# Patient Record
Sex: MALE | Race: WHITE
[De-identification: names, ages, dates, MRNs, and addresses within clinical notes are randomized per-mention and may not be internally consistent; named-entity substitution may affect disease eponyms.]

---

## 2018-08-09 ENCOUNTER — HOSPITAL ENCOUNTER (OUTPATIENT)
Dept: HOSPITAL 47 - RADXRYALE | Age: 18
End: 2018-08-09
Payer: COMMERCIAL

## 2018-08-09 DIAGNOSIS — S22.39XA: Primary | ICD-10-CM

## 2018-08-09 PROCEDURE — 71111 X-RAY EXAM RIBS/CHEST4/> VWS: CPT

## 2018-08-09 NOTE — XR
EXAMINATION TYPE: XR ribs bilat w pa chest xray

 

DATE OF EXAM: 8/9/2018

 

COMPARISON: NONE

 

HISTORY: Intermittent sternal and rib pain

 

TECHNIQUE: Frontal view of the chest as well as frontal and oblique views of the ribs were obtained.

 

FINDINGS: The lungs are clear without evidence of focal consolidation, pleural effusion or pneumothor
ax. Cardiomediastinal silhouette is within normal limits. Osseous structures are intact. No callused 
healed rib fracture is seen nor acute displaced rib fracture. No suspicious osseous lesion is seen.

 

IMPRESSION: No acute cardiopulmonary pathology, acute displaced rib fracture, callused healed rib fra
cture or suspicious osseous lesion seen.

## 2021-03-19 ENCOUNTER — HOSPITAL ENCOUNTER (OUTPATIENT)
Dept: HOSPITAL 47 - RADMRIMAIN | Age: 21
End: 2021-03-19
Attending: INTERNAL MEDICINE
Payer: COMMERCIAL

## 2021-03-19 DIAGNOSIS — E29.1: Primary | ICD-10-CM

## 2021-03-19 DIAGNOSIS — D35.2: ICD-10-CM

## 2021-03-19 PROCEDURE — 70553 MRI BRAIN STEM W/O & W/DYE: CPT

## 2021-03-19 NOTE — MR
EXAMINATION TYPE: MR pituitary wo/w con

 

DATE OF EXAM: 3/19/2021

 

COMPARISON: NONE

 

HISTORY: Testicular hypofunction, Prolactin Increased

 

TECHNIQUE: 

Multiplanar, multisequence images of the brain and brainstem is performed without and with IV contras
t, utilizing 12 mL intravenous Gadavist . Pituitary gland protocol.

 

FINDINGS: Pituitary gland normal in size within the pituitary stalk. Pituitary stalk shows normal enh
ancement in the midline. Concave superior margin noted. 

 

Postcontrast images show some heterogeneous enhancement with suggestion on T2-weighted images of a 5 
x 4 mm lesion left aspect of the gland coronal image 10 series 401. On T1 precontrast images there ar
e some heterogeneous diminished signal at this level without definitive lesion. Postcontrast images s
how some heterogeneous diminished enhancement at this level versus the opposite right side. Findings 
are concerning for focal pituitary microadenoma. I would expect complete nonenhancement to be more de
finitive.

 

Suprasellar cistern is maintained. Optic chiasm is not effaced. Visualized brain parenchyma unremarka
ble. No hydrocephalus. Craniocervical junction preserved. 

 

IMPRESSION: As above. MRI findings suspicious for a 5 x 4 mm left-sided pituitary microadenoma.

## 2022-04-23 ENCOUNTER — HOSPITAL ENCOUNTER (EMERGENCY)
Dept: HOSPITAL 47 - EC | Age: 22
Discharge: HOME | End: 2022-04-23
Payer: COMMERCIAL

## 2022-04-23 VITALS — RESPIRATION RATE: 87 BRPM | SYSTOLIC BLOOD PRESSURE: 125 MMHG | DIASTOLIC BLOOD PRESSURE: 85 MMHG

## 2022-04-23 VITALS — TEMPERATURE: 97.8 F | HEART RATE: 87 BPM

## 2022-04-23 DIAGNOSIS — F17.200: ICD-10-CM

## 2022-04-23 DIAGNOSIS — R19.7: ICD-10-CM

## 2022-04-23 DIAGNOSIS — R74.01: ICD-10-CM

## 2022-04-23 DIAGNOSIS — R11.2: Primary | ICD-10-CM

## 2022-04-23 LAB
ALBUMIN SERPL-MCNC: 4 G/DL (ref 3.5–5)
ALP SERPL-CCNC: 56 U/L (ref 38–126)
ALT SERPL-CCNC: 80 U/L (ref 4–49)
AMYLASE SERPL-CCNC: 59 U/L (ref 30–110)
ANION GAP SERPL CALC-SCNC: 7 MMOL/L
AST SERPL-CCNC: 29 U/L (ref 17–59)
BASOPHILS # BLD AUTO: 0.1 K/UL (ref 0–0.2)
BASOPHILS NFR BLD AUTO: 1 %
BUN SERPL-SCNC: 12 MG/DL (ref 9–20)
CALCIUM SPEC-MCNC: 8.9 MG/DL (ref 8.4–10.2)
CHLORIDE SERPL-SCNC: 106 MMOL/L (ref 98–107)
CO2 SERPL-SCNC: 27 MMOL/L (ref 22–30)
EOSINOPHIL # BLD AUTO: 0.2 K/UL (ref 0–0.7)
EOSINOPHIL NFR BLD AUTO: 3 %
ERYTHROCYTE [DISTWIDTH] IN BLOOD BY AUTOMATED COUNT: 5.39 M/UL (ref 4.3–5.9)
ERYTHROCYTE [DISTWIDTH] IN BLOOD: 12.7 % (ref 11.5–15.5)
GLUCOSE SERPL-MCNC: 104 MG/DL (ref 74–99)
HCT VFR BLD AUTO: 46 % (ref 39–53)
HGB BLD-MCNC: 15.5 GM/DL (ref 13–17.5)
LIPASE SERPL-CCNC: 155 U/L (ref 23–300)
LYMPHOCYTES # SPEC AUTO: 1.3 K/UL (ref 1–4.8)
LYMPHOCYTES NFR SPEC AUTO: 16 %
MCH RBC QN AUTO: 28.8 PG (ref 25–35)
MCHC RBC AUTO-ENTMCNC: 33.7 G/DL (ref 31–37)
MCV RBC AUTO: 85.3 FL (ref 80–100)
MONOCYTES # BLD AUTO: 0.6 K/UL (ref 0–1)
MONOCYTES NFR BLD AUTO: 8 %
NEUTROPHILS # BLD AUTO: 5.8 K/UL (ref 1.3–7.7)
NEUTROPHILS NFR BLD AUTO: 71 %
PH UR: 5.5 [PH] (ref 5–8)
PLATELET # BLD AUTO: 302 K/UL (ref 150–450)
POTASSIUM SERPL-SCNC: 3.7 MMOL/L (ref 3.5–5.1)
PROT SERPL-MCNC: 7 G/DL (ref 6.3–8.2)
SODIUM SERPL-SCNC: 140 MMOL/L (ref 137–145)
SP GR UR: 1.03 (ref 1–1.03)
UROBILINOGEN UR QL STRIP: <2 MG/DL (ref ?–2)
WBC # BLD AUTO: 8.1 K/UL (ref 3.8–10.6)

## 2022-04-23 PROCEDURE — 82150 ASSAY OF AMYLASE: CPT

## 2022-04-23 PROCEDURE — 81003 URINALYSIS AUTO W/O SCOPE: CPT

## 2022-04-23 PROCEDURE — 80053 COMPREHEN METABOLIC PANEL: CPT

## 2022-04-23 PROCEDURE — 99284 EMERGENCY DEPT VISIT MOD MDM: CPT

## 2022-04-23 PROCEDURE — 85025 COMPLETE CBC W/AUTO DIFF WBC: CPT

## 2022-04-23 PROCEDURE — 83690 ASSAY OF LIPASE: CPT

## 2022-04-23 PROCEDURE — 96361 HYDRATE IV INFUSION ADD-ON: CPT

## 2022-04-23 PROCEDURE — 96374 THER/PROPH/DIAG INJ IV PUSH: CPT

## 2022-04-23 PROCEDURE — 36415 COLL VENOUS BLD VENIPUNCTURE: CPT

## 2022-04-23 PROCEDURE — 74018 RADEX ABDOMEN 1 VIEW: CPT

## 2022-04-23 NOTE — XR
EXAMINATION TYPE: XR KUB

 

DATE OF EXAM: 4/23/2022

 

COMPARISON: NONE

 

HISTORY: Vomiting and fever

 

TECHNIQUE: 2 views upright

 

FINDINGS: There is no sign of intestinal obstruction or pneumoperitoneum. Fecal pattern is normal. No
 evidence of a mass. Lung bases are clear. There are no pathologic calcifications.

 

IMPRESSION: Nonacute abdomen.

## 2022-04-23 NOTE — ED
Nausea/Vomiting/Diarrhea HPI





- General


Chief complaint: Nausea/Vomiting/Diarrhea


Stated complaint: vomiting, fever


Time Seen by Provider: 04/23/22 13:50


Source: patient


Mode of arrival: ambulatory


Limitations: no limitations





- History of Present Illness


Initial comments: 


Patient is a 21-year-old male presenting with chief complaint of nausea, 

vomiting, diarrhea.  Patient states that this has been going on for several 

weeks.  It is intermittent, patient does not notice any patterns related to his 

symptoms.  No sick contacts or recent travel.  Denies abdominal pain, chest 

pain, shortness of breath, fever, chills, hematochezia, hematemesis, weakness, 

headache, vision or hearing changes, numbness, tingling, URI-like symptoms.








- Related Data


                                Home Medications











 Medication  Instructions  Recorded  Confirmed


 


ARIPiprazole [Abilify] 5 mg PO HS 04/23/22 04/23/22


 


Escitalopram [Lexapro] 20 mg PO HS 04/23/22 04/23/22


 


tadalafiL 5 mg PO DAILY PRN 04/23/22 04/23/22











                                    Allergies











Allergy/AdvReac Type Severity Reaction Status Date / Time


 


No Known Allergies Allergy   Verified 04/23/22 16:56














Review of Systems


ROS Statement: 


Those systems with pertinent positive or pertinent negative responses have been 

documented in the HPI.





ROS Other: All systems not noted in ROS Statement are negative.





Past Medical History


Past Medical History: No Reported History


History of Any Multi-Drug Resistant Organisms: None Reported


Past Surgical History: Hernia Repair


Past Psychological History: No Psychological Hx Reported


Smoking Status: Current every day smoker


Past Alcohol Use History: None Reported


Past Drug Use History: None Reported





General Exam


Limitations: no limitations


General appearance: alert, in no apparent distress


Head exam: Present: atraumatic, normocephalic, normal inspection


Eye exam: Present: normal appearance, EOMI.  Absent: scleral icterus


Neck exam: Present: normal inspection


Respiratory exam: Present: normal lung sounds bilaterally.  Absent: respiratory 

distress, wheezes, rales, rhonchi, stridor


Cardiovascular Exam: Present: regular rate, normal rhythm, normal heart sounds. 

 Absent: systolic murmur, diastolic murmur, rubs, gallop, clicks


GI/Abdominal exam: Present: soft, normal bowel sounds.  Absent: distended, 

tenderness, guarding, rebound, rigid


Neurological exam: Present: alert, oriented X3, CN II-XII intact


Psychiatric exam: Present: normal affect, normal mood


Skin exam: Present: warm, dry, intact, normal color.  Absent: rash





Course


                                   Vital Signs











  04/23/22 04/23/22





  12:44 17:05


 


Temperature 97.8 F 


 


Pulse Rate 87 87


 


Respiratory 16 87 H





Rate  


 


Blood Pressure 143/86 125/85


 


O2 Sat by Pulse 97 100





Oximetry  














Medical Decision Making





- Medical Decision Making


Patient is a 21-year-old male presenting with chief complaint of nausea and 

vomiting.  It has been occurring intermittently throughout the last several 

weeks.  Is accompanied by diarrhea.  Patient denies any abdominal pain, 

hematochezia, hematemesis.  On exam normal bowel sounds in all 4 quadrants, no 

tenderness on palpation.  Patient was given Reglan and IV fluids.  Lab work is 

only remarkable for slightly elevated ALT of 80.  KUB x-ray indicates a nonacute

 abdomen.  On reassessment patient states he is feeling much better.  He is able

 to keep down oral fluids and crackers.  Patient appears stable for discharge 

with outpatient follow-up at this time.  Follow-up with PCP this week.  Report 

back to ER if any worsening symptoms.  I educated the patient on return 

parameters and answered all questions.  Patient conveyed verbal understanding 

and agreed to the plan.  I discussed this case with my attending Dr. Hart.








- Lab Data


Result diagrams: 


                                 04/23/22 14:53





                                 04/23/22 14:53


                                   Lab Results











  04/23/22 04/23/22 04/23/22 Range/Units





  14:53 14:53 14:53 


 


WBC  8.1    (3.8-10.6)  k/uL


 


RBC  5.39    (4.30-5.90)  m/uL


 


Hgb  15.5    (13.0-17.5)  gm/dL


 


Hct  46.0    (39.0-53.0)  %


 


MCV  85.3    (80.0-100.0)  fL


 


MCH  28.8    (25.0-35.0)  pg


 


MCHC  33.7    (31.0-37.0)  g/dL


 


RDW  12.7    (11.5-15.5)  %


 


Plt Count  302    (150-450)  k/uL


 


MPV  6.8    


 


Neutrophils %  71    %


 


Lymphocytes %  16    %


 


Monocytes %  8    %


 


Eosinophils %  3    %


 


Basophils %  1    %


 


Neutrophils #  5.8    (1.3-7.7)  k/uL


 


Lymphocytes #  1.3    (1.0-4.8)  k/uL


 


Monocytes #  0.6    (0-1.0)  k/uL


 


Eosinophils #  0.2    (0-0.7)  k/uL


 


Basophils #  0.1    (0-0.2)  k/uL


 


Sodium    140  (137-145)  mmol/L


 


Potassium    3.7  (3.5-5.1)  mmol/L


 


Chloride    106  ()  mmol/L


 


Carbon Dioxide    27  (22-30)  mmol/L


 


Anion Gap    7  mmol/L


 


BUN    12  (9-20)  mg/dL


 


Creatinine    0.82  (0.66-1.25)  mg/dL


 


Est GFR (CKD-EPI)AfAm    >90  (>60 ml/min/1.73 sqM)  


 


Est GFR (CKD-EPI)NonAf    >90  (>60 ml/min/1.73 sqM)  


 


Glucose    104 H  (74-99)  mg/dL


 


Calcium    8.9  (8.4-10.2)  mg/dL


 


Total Bilirubin    0.4  (0.2-1.3)  mg/dL


 


AST    29  (17-59)  U/L


 


ALT    80 H  (4-49)  U/L


 


Alkaline Phosphatase    56  ()  U/L


 


Total Protein    7.0  (6.3-8.2)  g/dL


 


Albumin    4.0  (3.5-5.0)  g/dL


 


Amylase    59  ()  U/L


 


Lipase    155  ()  U/L


 


Urine Color   Yellow   


 


Urine Appearance   Clear   (Clear)  


 


Urine pH   5.5   (5.0-8.0)  


 


Ur Specific Gravity   1.027   (1.001-1.035)  


 


Urine Protein   Trace H   (Negative)  


 


Urine Glucose (UA)   Negative   (Negative)  


 


Urine Ketones   Negative   (Negative)  


 


Urine Blood   Negative   (Negative)  


 


Urine Nitrite   Negative   (Negative)  


 


Urine Bilirubin   Negative   (Negative)  


 


Urine Urobilinogen   <2.0   (<2.0)  mg/dL


 


Ur Leukocyte Esterase   Negative   (Negative)  














- Radiology Data


Radiology results: report reviewed, image reviewed


Abdominal x-ray: Nonacute abdomen





Disposition


Clinical Impression: 


 Nausea and vomiting





Disposition: HOME SELF-CARE


Condition: Good


Instructions (If sedation given, give patient instructions):  Acute Nausea and 

Vomiting (ED), Acute Diarrhea (ED)


Additional Instructions: 


Follow-up with primary care this week.  Report back to ER with any worsening 

symptoms, including but not limited to increased vomiting, abdominal pain, chest

 pain, shortness of breath, fever, chills.


Is patient prescribed a controlled substance at d/c from ED?: No


Referrals: 


Zurdo Barry MD [Primary Care Provider] - 1-2 days


Time of Disposition: 16:56